# Patient Record
Sex: FEMALE | Employment: FULL TIME | URBAN - METROPOLITAN AREA
[De-identification: names, ages, dates, MRNs, and addresses within clinical notes are randomized per-mention and may not be internally consistent; named-entity substitution may affect disease eponyms.]

---

## 2020-06-04 ENCOUNTER — TELEPHONE (OUTPATIENT)
Dept: OBSTETRICS AND GYNECOLOGY | Facility: CLINIC | Age: 40
End: 2020-06-04

## 2020-06-04 NOTE — TELEPHONE ENCOUNTER
Please let patient know I reviewed her breast ultrasound. The areas they were watching (possibly cysts?) were stable in the right breast but a little larger in the left breast. IF she has not seen a breast surgeon before I would recommend doing so for further evaluation.

## 2020-06-08 ENCOUNTER — TELEPHONE (OUTPATIENT)
Dept: OBSTETRICS AND GYNECOLOGY | Facility: CLINIC | Age: 40
End: 2020-06-08

## 2020-06-08 NOTE — TELEPHONE ENCOUNTER
----- Message from Guadalupe Vitale LPN sent at 6/5/2020 11:48 AM CDT -----  No answer call again with breast us results

## 2020-06-12 ENCOUNTER — TELEPHONE (OUTPATIENT)
Dept: OBSTETRICS AND GYNECOLOGY | Facility: CLINIC | Age: 40
End: 2020-06-12

## 2020-06-12 NOTE — TELEPHONE ENCOUNTER
After multiple attempts to contact pt by phone unsuccessfully, we sent a letter for the pt to contact us by phone.

## 2021-09-16 ENCOUNTER — OFFICE VISIT (OUTPATIENT)
Dept: PRIMARY CARE CLINIC | Facility: CLINIC | Age: 41
End: 2021-09-16
Payer: COMMERCIAL

## 2021-09-16 VITALS
BODY MASS INDEX: 20.46 KG/M2 | OXYGEN SATURATION: 97 % | HEART RATE: 103 BPM | WEIGHT: 111.19 LBS | RESPIRATION RATE: 16 BRPM | SYSTOLIC BLOOD PRESSURE: 124 MMHG | TEMPERATURE: 97 F | HEIGHT: 62 IN | DIASTOLIC BLOOD PRESSURE: 82 MMHG

## 2021-09-16 DIAGNOSIS — Z00.00 ROUTINE GENERAL MEDICAL EXAMINATION AT A HEALTH CARE FACILITY: ICD-10-CM

## 2021-09-16 DIAGNOSIS — M79.10 MUSCLE PAIN: Primary | ICD-10-CM

## 2021-09-16 DIAGNOSIS — G20.A1 PARKINSON DISEASE: ICD-10-CM

## 2021-09-16 PROCEDURE — 99386 PR PREVENTIVE VISIT,NEW,40-64: ICD-10-PCS | Mod: S$GLB,,, | Performed by: INTERNAL MEDICINE

## 2021-09-16 PROCEDURE — 3079F PR MOST RECENT DIASTOLIC BLOOD PRESSURE 80-89 MM HG: ICD-10-PCS | Mod: CPTII,S$GLB,, | Performed by: INTERNAL MEDICINE

## 2021-09-16 PROCEDURE — 3074F SYST BP LT 130 MM HG: CPT | Mod: CPTII,S$GLB,, | Performed by: INTERNAL MEDICINE

## 2021-09-16 PROCEDURE — 1160F RVW MEDS BY RX/DR IN RCRD: CPT | Mod: CPTII,S$GLB,, | Performed by: INTERNAL MEDICINE

## 2021-09-16 PROCEDURE — 3079F DIAST BP 80-89 MM HG: CPT | Mod: CPTII,S$GLB,, | Performed by: INTERNAL MEDICINE

## 2021-09-16 PROCEDURE — 1159F PR MEDICATION LIST DOCUMENTED IN MEDICAL RECORD: ICD-10-PCS | Mod: CPTII,S$GLB,, | Performed by: INTERNAL MEDICINE

## 2021-09-16 PROCEDURE — 3074F PR MOST RECENT SYSTOLIC BLOOD PRESSURE < 130 MM HG: ICD-10-PCS | Mod: CPTII,S$GLB,, | Performed by: INTERNAL MEDICINE

## 2021-09-16 PROCEDURE — 3008F PR BODY MASS INDEX (BMI) DOCUMENTED: ICD-10-PCS | Mod: CPTII,S$GLB,, | Performed by: INTERNAL MEDICINE

## 2021-09-16 PROCEDURE — 99386 PREV VISIT NEW AGE 40-64: CPT | Mod: S$GLB,,, | Performed by: INTERNAL MEDICINE

## 2021-09-16 PROCEDURE — 3008F BODY MASS INDEX DOCD: CPT | Mod: CPTII,S$GLB,, | Performed by: INTERNAL MEDICINE

## 2021-09-16 PROCEDURE — 1159F MED LIST DOCD IN RCRD: CPT | Mod: CPTII,S$GLB,, | Performed by: INTERNAL MEDICINE

## 2021-09-16 PROCEDURE — 1160F PR REVIEW ALL MEDS BY PRESCRIBER/CLIN PHARMACIST DOCUMENTED: ICD-10-PCS | Mod: CPTII,S$GLB,, | Performed by: INTERNAL MEDICINE

## 2021-09-16 RX ORDER — BACLOFEN 10 MG/1
10 TABLET ORAL 3 TIMES DAILY
Qty: 90 TABLET | Refills: 11 | Status: SHIPPED | OUTPATIENT
Start: 2021-09-16 | End: 2023-09-26

## 2021-09-22 ENCOUNTER — TELEPHONE (OUTPATIENT)
Dept: PRIMARY CARE CLINIC | Facility: CLINIC | Age: 41
End: 2021-09-22
Payer: COMMERCIAL

## 2021-09-24 PROCEDURE — G0180 PR HOME HEALTH MD CERTIFICATION: ICD-10-PCS | Mod: ,,, | Performed by: INTERNAL MEDICINE

## 2021-09-24 PROCEDURE — G0180 MD CERTIFICATION HHA PATIENT: HCPCS | Mod: ,,, | Performed by: INTERNAL MEDICINE

## 2021-10-01 ENCOUNTER — TELEPHONE (OUTPATIENT)
Dept: PRIMARY CARE CLINIC | Facility: CLINIC | Age: 41
End: 2021-10-01

## 2021-10-22 ENCOUNTER — EXTERNAL HOME HEALTH (OUTPATIENT)
Dept: HOME HEALTH SERVICES | Facility: HOSPITAL | Age: 41
End: 2021-10-22
Payer: COMMERCIAL

## 2023-01-23 ENCOUNTER — PATIENT OUTREACH (OUTPATIENT)
Dept: ADMINISTRATIVE | Facility: HOSPITAL | Age: 43
End: 2023-01-23
Payer: COMMERCIAL

## 2023-01-23 NOTE — PROGRESS NOTES
Working gap report for pts not seen in the past 12 months or longer. According to CE pt is being seen in [place] with a Neurologist who is providing her care. LVM. No results found.

## 2023-09-26 ENCOUNTER — PATIENT OUTREACH (OUTPATIENT)
Dept: ADMINISTRATIVE | Facility: HOSPITAL | Age: 43
End: 2023-09-26
Payer: COMMERCIAL

## 2023-09-26 ENCOUNTER — OFFICE VISIT (OUTPATIENT)
Dept: PRIMARY CARE CLINIC | Facility: CLINIC | Age: 43
End: 2023-09-26
Payer: COMMERCIAL

## 2023-09-26 VITALS
BODY MASS INDEX: 23.37 KG/M2 | TEMPERATURE: 97 F | OXYGEN SATURATION: 99 % | HEIGHT: 62 IN | DIASTOLIC BLOOD PRESSURE: 59 MMHG | WEIGHT: 127 LBS | SYSTOLIC BLOOD PRESSURE: 97 MMHG | RESPIRATION RATE: 16 BRPM | HEART RATE: 84 BPM

## 2023-09-26 DIAGNOSIS — Z12.31 BREAST CANCER SCREENING BY MAMMOGRAM: ICD-10-CM

## 2023-09-26 DIAGNOSIS — Z00.00 ROUTINE GENERAL MEDICAL EXAMINATION AT A HEALTH CARE FACILITY: Primary | ICD-10-CM

## 2023-09-26 DIAGNOSIS — R05.3 CHRONIC COUGH: ICD-10-CM

## 2023-09-26 DIAGNOSIS — G20.A1 PARKINSON DISEASE: ICD-10-CM

## 2023-09-26 PROCEDURE — 99396 PR PREVENTIVE VISIT,EST,40-64: ICD-10-PCS | Mod: S$GLB,,, | Performed by: INTERNAL MEDICINE

## 2023-09-26 PROCEDURE — 1160F RVW MEDS BY RX/DR IN RCRD: CPT | Mod: CPTII,S$GLB,, | Performed by: INTERNAL MEDICINE

## 2023-09-26 PROCEDURE — 1160F PR REVIEW ALL MEDS BY PRESCRIBER/CLIN PHARMACIST DOCUMENTED: ICD-10-PCS | Mod: CPTII,S$GLB,, | Performed by: INTERNAL MEDICINE

## 2023-09-26 PROCEDURE — 1159F MED LIST DOCD IN RCRD: CPT | Mod: CPTII,S$GLB,, | Performed by: INTERNAL MEDICINE

## 2023-09-26 PROCEDURE — 3008F BODY MASS INDEX DOCD: CPT | Mod: CPTII,S$GLB,, | Performed by: INTERNAL MEDICINE

## 2023-09-26 PROCEDURE — 3078F DIAST BP <80 MM HG: CPT | Mod: CPTII,S$GLB,, | Performed by: INTERNAL MEDICINE

## 2023-09-26 PROCEDURE — 99396 PREV VISIT EST AGE 40-64: CPT | Mod: S$GLB,,, | Performed by: INTERNAL MEDICINE

## 2023-09-26 PROCEDURE — 3008F PR BODY MASS INDEX (BMI) DOCUMENTED: ICD-10-PCS | Mod: CPTII,S$GLB,, | Performed by: INTERNAL MEDICINE

## 2023-09-26 PROCEDURE — 1159F PR MEDICATION LIST DOCUMENTED IN MEDICAL RECORD: ICD-10-PCS | Mod: CPTII,S$GLB,, | Performed by: INTERNAL MEDICINE

## 2023-09-26 PROCEDURE — 3074F PR MOST RECENT SYSTOLIC BLOOD PRESSURE < 130 MM HG: ICD-10-PCS | Mod: CPTII,S$GLB,, | Performed by: INTERNAL MEDICINE

## 2023-09-26 PROCEDURE — 3074F SYST BP LT 130 MM HG: CPT | Mod: CPTII,S$GLB,, | Performed by: INTERNAL MEDICINE

## 2023-09-26 PROCEDURE — 3078F PR MOST RECENT DIASTOLIC BLOOD PRESSURE < 80 MM HG: ICD-10-PCS | Mod: CPTII,S$GLB,, | Performed by: INTERNAL MEDICINE

## 2023-09-26 RX ORDER — ALBUTEROL SULFATE 90 UG/1
2 AEROSOL, METERED RESPIRATORY (INHALATION) EVERY 6 HOURS PRN
Qty: 18 G | Refills: 0 | Status: SHIPPED | OUTPATIENT
Start: 2023-09-26 | End: 2024-09-25

## 2023-09-26 RX ORDER — CARBIDOPA AND LEVODOPA 25; 100 MG/1; MG/1
1.5 TABLET ORAL
COMMUNITY
Start: 2023-09-23

## 2023-09-26 RX ORDER — AMANTADINE HYDROCHLORIDE 100 MG/1
1 CAPSULE, GELATIN COATED ORAL DAILY
COMMUNITY
Start: 2023-09-13

## 2023-09-26 RX ORDER — MAGNESIUM 250 MG
1 TABLET ORAL DAILY
COMMUNITY

## 2023-09-26 NOTE — PROGRESS NOTES
Subjective:      Patient ID: Winter Pina is a 42 y.o. female.    Chief Complaint: Cough (C/o ongoing dry nonproductive cough for >1yr)     Patient with Parkinson disease and is being followed by neurologist in [facility], Dr Humera Trammell.  Patient reports symptoms are under okay control.    Patient presented today for wellness exam.  And also complained of cough x >1 year.  Cough is nonproductive, almost every day, shortness a breath, no fever or chills, reports occasional runny nose, Nasal congestion, postnasal drip.  Patient denies any history of asthma or tobacco consumption.    Patient also complains of insomnia and reports going to bed at 10:30 p.m. and wakes up at 2:00 a.m..  Patient is not able to go back to sleep and starts reading.  Patient may go back to sleep from 9-10 a.m. but denies more daytime naps.  Patient denies feeling tired throughout the day.    Review of Systems   Constitutional:  Negative for chills, diaphoresis, fever, malaise/fatigue and weight loss.   HENT:  Negative for congestion, ear pain, sinus pain, sore throat and tinnitus.    Eyes:  Negative for blurred vision and photophobia.   Respiratory:  Positive for cough. Negative for hemoptysis, shortness of breath and wheezing.    Cardiovascular:  Negative for chest pain, palpitations, orthopnea, leg swelling and PND.   Gastrointestinal:  Negative for abdominal pain, blood in stool, constipation, diarrhea, heartburn, melena, nausea and vomiting.   Genitourinary:  Negative for dysuria, frequency and urgency.   Musculoskeletal:  Negative for back pain, myalgias and neck pain.   Skin:  Negative for rash.   Neurological:  Negative for dizziness, tremors, seizures, loss of consciousness and weakness.   Endo/Heme/Allergies:  Negative for polydipsia.   Psychiatric/Behavioral:  Negative for depression and hallucinations. The patient does not have insomnia.      Objective:   BP (!) 97/59 (BP Location: Left arm,  "Patient Position: Sitting, BP Method: Medium (Automatic))   Pulse 84   Temp 97.2 °F (36.2 °C) (Temporal)   Resp 16   Ht 5' 2" (1.575 m)   Wt 57.6 kg (127 lb)   SpO2 99%   BMI 23.23 kg/m²     Physical Exam  Constitutional:       General: She is not in acute distress.     Appearance: She is not diaphoretic.   HENT:      Mouth/Throat:      Comments: No mucoid discharge on posterior pharynx  Neck:      Thyroid: No thyromegaly.   Cardiovascular:      Rate and Rhythm: Normal rate and regular rhythm.      Heart sounds: Normal heart sounds. No murmur heard.  Pulmonary:      Effort: Pulmonary effort is normal. No respiratory distress.      Breath sounds: Normal breath sounds. No wheezing.   Abdominal:      General: Bowel sounds are normal. There is no distension.      Palpations: Abdomen is soft.      Tenderness: There is no abdominal tenderness.   Lymphadenopathy:      Cervical: No cervical adenopathy.   Neurological:      Mental Status: She is alert and oriented to person, place, and time.   Psychiatric:         Behavior: Behavior normal.         Thought Content: Thought content normal.         Judgment: Judgment normal.       Assessment:       ICD-10-CM ICD-9-CM   1. Routine general medical examination at a health care facility  Z00.00 V70.0   2. Parkinson disease  G20 332.0   3. Breast cancer screening by mammogram  Z12.31 V76.12   4. Chronic cough  R05.3 786.2       Plan:     Patient is here for wellness exam, Will order Annual labs  Will order mammogram + Pap smear  Patient has Parkinson's disease and is being followed by neurologist at [facility]  Patient's symptoms are under good control advised patient to keep appointment  Patient has chronic cough, will use ProAir for symptomatic relief  Will get chest x-ray and pulmonary function test..  To further evaluate patient cause of cough  Patient complains of insomnia but is happy with her sleep habits.  Does not feel tired throughout the day.   Will not start on any " medication at this time.    Medication List with Changes/Refills   New Medications    ALBUTEROL (PROAIR HFA) 90 MCG/ACTUATION INHALER    Inhale 2 puffs into the lungs every 6 (six) hours as needed for Wheezing. Rescue   Current Medications    AMANTADINE HCL (SYMMETREL) 100 MG CAPSULE    Take 1 capsule by mouth once daily.    BACLOFEN (LIORESAL) 10 MG TABLET    Take 1 tablet (10 mg total) by mouth 3 (three) times daily.    CARBIDOPA-LEVODOPA  MG (SINEMET)  MG PER TABLET    Take 1.5 tablets by mouth 5 (five) times daily.    ENZYMES,DIGESTIVE (DIGESTIVE ENZYMES ORAL)    Take 2 capsules by mouth once daily. OTC    LACTOBAC NO.41/BIFIDOBACT NO.7 (PROBIOTIC-10 ORAL)    Take 1 capsule by mouth once daily. OTC    MAGNESIUM 250 MG TAB    Take 1 tablet by mouth once daily. OTC

## 2023-09-26 NOTE — PROGRESS NOTES
Attempted to contact patient by phone for PCP visit, was able to speak to patient. Patient saw PCP today and has appointment in December.

## 2023-10-16 LAB — BCS RECOMMENDATION EXT: NORMAL

## 2023-10-24 DIAGNOSIS — R92.8 ABNORMAL MAMMOGRAM OF BOTH BREASTS: Primary | ICD-10-CM

## 2023-11-15 DIAGNOSIS — R92.8 ABNORMAL MAMMOGRAM OF BOTH BREASTS: Primary | ICD-10-CM

## 2023-11-29 NOTE — PROGRESS NOTES
CC: WELL WOMAN (age 40-44)    Patient Care Team:  Fely Merida MD as PCP - General (Internal Medicine)  Claire Flores MD (Inactive) (General Surgery)  Phoebe Benjamin MD (Gastroenterology)    HPI:  Patient is a 42 y.o. who presents for her well woman exam today.  History reviewed with patient. Cycles are regular every month- not heavy or painfula and last about 6-7 days total  Patient is without complaints or concerns today.     NEW PATIENT       CONTRACEPTION:  none  GARDASIL: no- declines  HX ABNL PAPS: none    REVIEW OF PRIOR DATA/ HEALTH MAINTENANCE:  LAST ANNUAL:        LAST MMG (screening)- 2023- normal at [facility]-bilsteral benign cysts- follow up in 6 months  LAST LABS- does with specialist   LAST COLONOSCOPY- - by Dr. Benjamin - q 10 yrs (neg)      Past Medical History:   Diagnosis Date    Family history of breast cancer     myriad neg-LTR= 10%    Hypoglycemia     IBS (irritable bowel syndrome)     Lump of breast, left     Miscarriage     Parkinson disease     [place]    Poor nutrition      SURGICAL HX:   has a past surgical history that includes  section; Dilation and curettage of uterus (2018); Dilation and curettage of uterus (2017); and Breast biopsy (Left, ).    SOCIAL HX:    reports that she has never smoked. She has never used smokeless tobacco. She reports that she does not drink alcohol and does not use drugs.    FAMILY HX:   family history includes Breast cancer (age of onset: 50) in her maternal aunt; Heart attack (age of onset: 51) in her father; Uterine cancer (age of onset: 40) in her mother. .    ALLERGIES:  Anesthesia s/i-40 (propofol) [propofol] and Codeine    Current Outpatient Medications   Medication Instructions    albuterol (PROAIR HFA) 90 mcg/actuation inhaler 2 puffs, Inhalation, Every 6 hours PRN, Rescue    amantadine HCL (SYMMETREL) 100 mg capsule 1 capsule, Oral, Daily    carbidopa-levodopa  mg (SINEMET)   "mg per tablet 1.5 tablets, Oral, 5 times daily    enzymes,digestive (DIGESTIVE ENZYMES ORAL) 2 capsules, Oral, Daily, OTC    Lactobac no.41/Bifidobact no.7 (PROBIOTIC-10 ORAL) 1 capsule, Oral, Daily, OTC    magnesium 250 mg Tab 1 tablet, Oral, Daily, OTC     ROS:  CONST:  No fever, chills, fatigue or unexpected changes in weight   CV: No chest pain or palpitations   RESP:  No shortness of breath or cough   GI: No abd pain, vomiting, diarrhea, blood in stool, or changes in bowel mvmts   SKIN: No rashes or lesions  MUSCULOSKELETAL: No joint swelling or pain   PSYCH: No changes in mood or insomia   BREASTS: No asymmetry, lumps, pain, nipple discharge, or skin changes   :  No dysuria, urgency, frequency, hematuria or incontinence           No vag dc, itching, odor or dryness           No pelvic pain, dyspareunia, or abnormal vaginal bleeding     VITALS:  Blood pressure 99/63, height 5' 2" (1.575 m), weight 55.8 kg (123 lb), last menstrual period 11/27/2023.  Body mass index is 22.5 kg/m².     PHYSICAL EXAM-  APPEARANCE: Well appearing, in no acute distress.   NECK: Neck symmetric.   CV:  Normal rate   PULM: Normal resp rate, no resp distress, normal resp effort    PSYCH: Normal mood and affect, cooperative   SKIN: No rashes, lesions, or abnormal bruising   LYMPH: No inguinal or axillary adenopathy   ABD: Soft, without tenderness or masses.   BREAST: Symmetrical, no nipple changes, no skin changes, No palpable masses   PELVIC:  VULVA: No lesions. Normal female genitalia.  URETHRAL MEATUS: No masses, no significant prolapse.   BLADDER/ URETHRA: No masses or suprapubic tenderness   VAGINA: No lesions or erythema, No discharge.   CVX: No lesions,no cervical motion tenderness.   UTERUS: Normal size/shape, mobile, non-tender   ADNEXA: No masses, tenderness, or fullness.   ANUS/ PERINEUM: Normal tone.  No lesions.     *female chaperone present for entire exam    ASSESSMENT and PLAN:  Encounter for well woman exam with " routine gynecological exam  -     Liquid-based pap smear, screening    Bilateral breast cysts  -     Mammo Digital Diagnostic Bilat with [facility]; Future; Expected date: 12/06/2023    Routine general medical examination at a health care facility  -     Ambulatory referral/consult to Obstetrics / Gynecology       FOLLOWUP:  1 year for wwe or sooner prn    COUNSELING:  Patient was counseled today on recommendation for yearly pelvic exam, current Pap guidelines, self breast exams, contraception, recommendations for annual screening mammograms, and routine screening colonoscopy at age 43.  Encouraged patient to see her PCP for other health maintenance.

## 2023-11-30 ENCOUNTER — OFFICE VISIT (OUTPATIENT)
Dept: OBSTETRICS AND GYNECOLOGY | Facility: CLINIC | Age: 43
End: 2023-11-30
Payer: COMMERCIAL

## 2023-11-30 VITALS
BODY MASS INDEX: 22.63 KG/M2 | WEIGHT: 123 LBS | HEIGHT: 62 IN | DIASTOLIC BLOOD PRESSURE: 63 MMHG | SYSTOLIC BLOOD PRESSURE: 99 MMHG

## 2023-11-30 DIAGNOSIS — N60.02 BILATERAL BREAST CYSTS: ICD-10-CM

## 2023-11-30 DIAGNOSIS — N60.01 BILATERAL BREAST CYSTS: ICD-10-CM

## 2023-11-30 DIAGNOSIS — Z01.419 ENCOUNTER FOR WELL WOMAN EXAM WITH ROUTINE GYNECOLOGICAL EXAM: Primary | ICD-10-CM

## 2023-11-30 DIAGNOSIS — Z00.00 ROUTINE GENERAL MEDICAL EXAMINATION AT A HEALTH CARE FACILITY: ICD-10-CM

## 2023-11-30 PROCEDURE — 3008F PR BODY MASS INDEX (BMI) DOCUMENTED: ICD-10-PCS | Mod: CPTII,S$GLB,, | Performed by: OBSTETRICS & GYNECOLOGY

## 2023-11-30 PROCEDURE — 3008F BODY MASS INDEX DOCD: CPT | Mod: CPTII,S$GLB,, | Performed by: OBSTETRICS & GYNECOLOGY

## 2023-11-30 PROCEDURE — 99386 PREV VISIT NEW AGE 40-64: CPT | Mod: S$GLB,,, | Performed by: OBSTETRICS & GYNECOLOGY

## 2023-11-30 PROCEDURE — 3078F DIAST BP <80 MM HG: CPT | Mod: CPTII,S$GLB,, | Performed by: OBSTETRICS & GYNECOLOGY

## 2023-11-30 PROCEDURE — 3074F SYST BP LT 130 MM HG: CPT | Mod: CPTII,S$GLB,, | Performed by: OBSTETRICS & GYNECOLOGY

## 2023-11-30 PROCEDURE — 3078F PR MOST RECENT DIASTOLIC BLOOD PRESSURE < 80 MM HG: ICD-10-PCS | Mod: CPTII,S$GLB,, | Performed by: OBSTETRICS & GYNECOLOGY

## 2023-11-30 PROCEDURE — 99386 PR PREVENTIVE VISIT,NEW,40-64: ICD-10-PCS | Mod: S$GLB,,, | Performed by: OBSTETRICS & GYNECOLOGY

## 2023-11-30 PROCEDURE — 1159F PR MEDICATION LIST DOCUMENTED IN MEDICAL RECORD: ICD-10-PCS | Mod: CPTII,S$GLB,, | Performed by: OBSTETRICS & GYNECOLOGY

## 2023-11-30 PROCEDURE — 3074F PR MOST RECENT SYSTOLIC BLOOD PRESSURE < 130 MM HG: ICD-10-PCS | Mod: CPTII,S$GLB,, | Performed by: OBSTETRICS & GYNECOLOGY

## 2023-11-30 PROCEDURE — 1159F MED LIST DOCD IN RCRD: CPT | Mod: CPTII,S$GLB,, | Performed by: OBSTETRICS & GYNECOLOGY

## 2023-12-04 LAB — Lab: NORMAL

## 2023-12-11 ENCOUNTER — PATIENT OUTREACH (OUTPATIENT)
Dept: ADMINISTRATIVE | Facility: HOSPITAL | Age: 43
End: 2023-12-11
Payer: COMMERCIAL

## 2023-12-12 NOTE — PROGRESS NOTES
Oct 2023 Mammogram hyper linked to  with recommendation for US.  Nov 2023 US Breast Rt, entered/scanned into chart, sent to pcp.

## 2023-12-18 ENCOUNTER — OFFICE VISIT (OUTPATIENT)
Dept: PRIMARY CARE CLINIC | Facility: CLINIC | Age: 43
End: 2023-12-18
Payer: COMMERCIAL

## 2023-12-18 VITALS
OXYGEN SATURATION: 99 % | BODY MASS INDEX: 23.55 KG/M2 | HEART RATE: 83 BPM | WEIGHT: 128 LBS | DIASTOLIC BLOOD PRESSURE: 61 MMHG | RESPIRATION RATE: 18 BRPM | HEIGHT: 62 IN | TEMPERATURE: 98 F | SYSTOLIC BLOOD PRESSURE: 98 MMHG

## 2023-12-18 DIAGNOSIS — G20.A1 PARKINSON'S DISEASE WITHOUT DYSKINESIA OR FLUCTUATING MANIFESTATIONS: ICD-10-CM

## 2023-12-18 DIAGNOSIS — R92.8 ABNORMAL MAMMOGRAM OF BOTH BREASTS: ICD-10-CM

## 2023-12-18 DIAGNOSIS — R05.3 CHRONIC COUGH: Primary | ICD-10-CM

## 2023-12-18 PROCEDURE — 3078F PR MOST RECENT DIASTOLIC BLOOD PRESSURE < 80 MM HG: ICD-10-PCS | Mod: CPTII,S$GLB,, | Performed by: INTERNAL MEDICINE

## 2023-12-18 PROCEDURE — 3074F PR MOST RECENT SYSTOLIC BLOOD PRESSURE < 130 MM HG: ICD-10-PCS | Mod: CPTII,S$GLB,, | Performed by: INTERNAL MEDICINE

## 2023-12-18 PROCEDURE — 1160F PR REVIEW ALL MEDS BY PRESCRIBER/CLIN PHARMACIST DOCUMENTED: ICD-10-PCS | Mod: CPTII,S$GLB,, | Performed by: INTERNAL MEDICINE

## 2023-12-18 PROCEDURE — 1160F RVW MEDS BY RX/DR IN RCRD: CPT | Mod: CPTII,S$GLB,, | Performed by: INTERNAL MEDICINE

## 2023-12-18 PROCEDURE — 3078F DIAST BP <80 MM HG: CPT | Mod: CPTII,S$GLB,, | Performed by: INTERNAL MEDICINE

## 2023-12-18 PROCEDURE — 3008F BODY MASS INDEX DOCD: CPT | Mod: CPTII,S$GLB,, | Performed by: INTERNAL MEDICINE

## 2023-12-18 PROCEDURE — 1159F MED LIST DOCD IN RCRD: CPT | Mod: CPTII,S$GLB,, | Performed by: INTERNAL MEDICINE

## 2023-12-18 PROCEDURE — 99214 PR OFFICE/OUTPT VISIT, EST, LEVL IV, 30-39 MIN: ICD-10-PCS | Mod: S$GLB,,, | Performed by: INTERNAL MEDICINE

## 2023-12-18 PROCEDURE — 3074F SYST BP LT 130 MM HG: CPT | Mod: CPTII,S$GLB,, | Performed by: INTERNAL MEDICINE

## 2023-12-18 PROCEDURE — 3008F PR BODY MASS INDEX (BMI) DOCUMENTED: ICD-10-PCS | Mod: CPTII,S$GLB,, | Performed by: INTERNAL MEDICINE

## 2023-12-18 PROCEDURE — 99214 OFFICE O/P EST MOD 30 MIN: CPT | Mod: S$GLB,,, | Performed by: INTERNAL MEDICINE

## 2023-12-18 PROCEDURE — 1159F PR MEDICATION LIST DOCUMENTED IN MEDICAL RECORD: ICD-10-PCS | Mod: CPTII,S$GLB,, | Performed by: INTERNAL MEDICINE

## 2023-12-18 RX ORDER — PREDNISONE 20 MG/1
20 TABLET ORAL DAILY
Qty: 7 TABLET | Refills: 0 | Status: SHIPPED | OUTPATIENT
Start: 2023-12-18 | End: 2023-12-25

## 2023-12-18 RX ORDER — BENZONATATE 100 MG/1
100 CAPSULE ORAL 3 TIMES DAILY PRN
Qty: 30 CAPSULE | Refills: 0 | Status: SHIPPED | OUTPATIENT
Start: 2023-12-18 | End: 2023-12-28

## 2023-12-18 NOTE — PROGRESS NOTES
Subjective:      Patient ID: Winter Pina is a 43 y.o. female.    Chief Complaint: Follow-up (Pt c/o cough)    : Patient with Parkinson disease and is being followed by neurologist in [facility], Dr Humera Trammell.  Patient reports symptoms are under okay control.     Patient presented on last visit with persistent cough for more than a year.  Cough is nonproductive, almost every day, not associated with shortness of breath no fever or chills.  Patient had chest x-ray that was negative and reports pulmonary function tests were also done (results not available) patient was given albuterol inhaler without much help.  Patient reports no runny nose, nasal congestion, postnasal drip.  Patient reports over-the-counter medication may help.  No GERD symptoms    Patient reports snoring at night, waking up feeling tired, taking naps during daytime.     Review of Systems   Constitutional:  Negative for chills, diaphoresis, fever, malaise/fatigue and weight loss.   HENT:  Negative for congestion, ear pain, sinus pain, sore throat and tinnitus.    Eyes:  Negative for blurred vision and photophobia.   Respiratory:  Positive for cough. Negative for hemoptysis, shortness of breath and wheezing.    Cardiovascular:  Negative for chest pain, palpitations, orthopnea, leg swelling and PND.   Gastrointestinal:  Negative for abdominal pain, blood in stool, constipation, diarrhea, heartburn, melena, nausea and vomiting.   Genitourinary:  Negative for dysuria, frequency and urgency.   Musculoskeletal:  Negative for back pain, myalgias and neck pain.   Skin:  Negative for rash.   Neurological:  Negative for dizziness, tremors, seizures, loss of consciousness and weakness.   Endo/Heme/Allergies:  Negative for polydipsia.   Psychiatric/Behavioral:  Negative for depression and hallucinations. The patient does not have insomnia.      Objective:   BP 98/61 (BP Location: Left arm, Patient Position: Sitting, BP Method:  "Medium (Automatic))   Pulse 83   Temp 98.1 °F (36.7 °C) (Oral)   Resp 18   Ht 5' 2" (1.575 m)   Wt 58.1 kg (128 lb)   LMP 11/27/2023 (Approximate)   SpO2 99%   BMI 23.41 kg/m²     Physical Exam  Constitutional:       General: She is not in acute distress.     Appearance: She is not diaphoretic.   Neck:      Thyroid: No thyromegaly.   Cardiovascular:      Rate and Rhythm: Normal rate and regular rhythm.      Heart sounds: Normal heart sounds. No murmur heard.  Pulmonary:      Effort: Pulmonary effort is normal. No respiratory distress.      Breath sounds: Normal breath sounds. No wheezing.   Abdominal:      General: Bowel sounds are normal. There is no distension.      Palpations: Abdomen is soft.      Tenderness: There is no abdominal tenderness.   Lymphadenopathy:      Cervical: No cervical adenopathy.   Neurological:      Mental Status: She is alert and oriented to person, place, and time.   Psychiatric:         Behavior: Behavior normal.         Thought Content: Thought content normal.         Judgment: Judgment normal.       Assessment:       ICD-10-CM ICD-9-CM   1. Chronic cough  R05.3 786.2   2. Abnormal mammogram of both breasts  R92.8 793.80   3. Parkinson's disease without dyskinesia or fluctuating manifestations  G20.A1 332.0       Plan:     Patient with chronic dry cough.   Chest x-ray is normal  Pulmonary function test are done but results are not available  Patient has prescribed albuterol without much help with the symptoms  Will use prednisone and Tessalon  Will follow-up on pulmonary function test  Had mammogram that was abnormal + ultrasound showed benign looking cyst in the breast  Repeat ultrasound is recommended in 6 months (May 2024)  Patient has Parkinson's disease and is being followed by neurologist and symptoms are under good control  Advised patient to keep appointments    Medication List with Changes/Refills   Current Medications    ALBUTEROL (PROAIR HFA) 90 MCG/ACTUATION INHALER   "  Inhale 2 puffs into the lungs every 6 (six) hours as needed for Wheezing. Rescue    AMANTADINE HCL (SYMMETREL) 100 MG CAPSULE    Take 1 capsule by mouth once daily.    CARBIDOPA-LEVODOPA  MG (SINEMET)  MG PER TABLET    Take 1.5 tablets by mouth 5 (five) times daily.    ENZYMES,DIGESTIVE (DIGESTIVE ENZYMES ORAL)    Take 2 capsules by mouth once daily. OTC    LACTOBAC NO.41/BIFIDOBACT NO.7 (PROBIOTIC-10 ORAL)    Take 1 capsule by mouth once daily. OTC    MAGNESIUM 250 MG TAB    Take 1 tablet by mouth once daily. OTC

## 2024-01-03 ENCOUNTER — TELEPHONE (OUTPATIENT)
Dept: PRIMARY CARE CLINIC | Facility: CLINIC | Age: 44
End: 2024-01-03
Payer: COMMERCIAL

## 2024-01-03 NOTE — TELEPHONE ENCOUNTER
Patient had PFT at [facility], request was sent to there office for records. Patient advised to contact there office regarding billing questions.

## 2024-01-03 NOTE — TELEPHONE ENCOUNTER
----- Message from Nelsy Little sent at 1/3/2024  3:17 PM CST -----  Contact: self  Type:  Test Results    Who Called: Winter Pina  Name of Test (Lab/Mammo/Etc): breathing  Date of Test: 12/2023  Ordering Provider: vince  Where the test was performed: lab  Would the patient rather a call back or a response via [facility]?   Best Call Back Number: [phone]  Additional Information:  n/a

## 2024-01-09 ENCOUNTER — TELEPHONE (OUTPATIENT)
Dept: PRIMARY CARE CLINIC | Facility: CLINIC | Age: 44
End: 2024-01-09
Payer: COMMERCIAL

## 2024-01-09 NOTE — TELEPHONE ENCOUNTER
----- Message from Cira Burns sent at 1/9/2024  3:54 PM CST -----  Contact: Felipe()  Felipe called to consult with nurse or staff regarding the patient. He wanted to verify what time the lab opens on tomorrow and also wanted to verify if the opening was available for the patient to come in for bloodwork. He would like a call back and can be reached at [phone]. Thanks/MR

## 2024-01-12 ENCOUNTER — CLINICAL SUPPORT (OUTPATIENT)
Dept: OBSTETRICS AND GYNECOLOGY | Facility: CLINIC | Age: 44
End: 2024-01-12
Payer: COMMERCIAL

## 2024-01-12 DIAGNOSIS — Z01.89 ROUTINE LAB DRAW: Primary | ICD-10-CM

## 2024-01-12 LAB
ABS NRBC COUNT: 0 THOU/UL (ref 0–0.01)
ABSOLUTE BASOPHIL: 0.1 10*3/UL (ref 0–0.3)
ABSOLUTE EOSINOPHIL: 0.1 10*3/UL (ref 0–0.6)
ABSOLUTE IMMATURE GRAN: 0.01 THOU/UL (ref 0–0.03)
ABSOLUTE LYMPHOCYTE: 2.7 10*3/UL (ref 1.2–4)
ABSOLUTE MONOCYTE: 0.4 10*3/UL (ref 0.1–0.8)
ALBUMIN SERPL BCP-MCNC: 4 G/DL (ref 3.4–5)
ALP SERPL-CCNC: 69 U/L (ref 45–117)
ALT SERPL W P-5'-P-CCNC: 22 U/L (ref 13–56)
ANION GAP SERPL CALC-SCNC: 2 MMOL/L (ref 3–11)
AST SERPL-CCNC: 10 U/L (ref 15–37)
BASOPHILS NFR BLD: 0.9 % (ref 0–3)
BILIRUB SERPL-MCNC: 0.5 MG/DL (ref 0.2–1)
BUN SERPL-MCNC: 11 MG/DL (ref 7–18)
BUN/CREAT SERPL: 16.41 RATIO
CALCIUM SERPL-MCNC: 9.2 MG/DL (ref 8.5–10.1)
CHLORIDE SERPL-SCNC: 105 MMOL/L (ref 98–107)
CHOLEST SERPL-MSCNC: 179 MG/DL
CO2 SERPL-SCNC: 29 MMOL/L (ref 21–32)
CREAT SERPL-MCNC: 0.67 MG/DL (ref 0.55–1.02)
EOSINOPHIL NFR BLD: 0.9 % (ref 0–6)
ERYTHROCYTE [DISTWIDTH] IN BLOOD BY AUTOMATED COUNT: 12.5 % (ref 0–15.5)
GFR ESTIMATION: > 60
GLUCOSE SERPL-MCNC: 92 MG/DL (ref 74–106)
HCT VFR BLD AUTO: 40.3 % (ref 37–47)
HCV AB SERPL QL IA: NONREACTIVE
HDLC SERPL-MCNC: 57 MG/DL
HGB BLD-MCNC: 13 G/DL (ref 12–16)
IMMATURE GRANULOCYTES: 0.2 % (ref 0–0.43)
LDLC SERPL CALC-MCNC: 109.4 MG/DL
LYMPHOCYTES NFR BLD: 47.3 % (ref 20–45)
MCH RBC QN AUTO: 30 PG (ref 27–32)
MCHC RBC AUTO-ENTMCNC: 32.3 % (ref 32–36)
MCV RBC AUTO: 92.9 FL (ref 80–99)
MONOCYTES NFR BLD: 7.8 % (ref 2–10)
NEUTROPHILS # BLD AUTO: 2.4 10*3/UL (ref 1.4–7)
NEUTROPHILS NFR BLD: 42.9 % (ref 50–80)
NUCLEATED RED BLOOD CELLS: 0 % (ref 0–0.2)
PLATELETS: 344 10*3/UL (ref 130–400)
PMV BLD AUTO: 9.4 FL (ref 9.2–12.2)
POTASSIUM SERPL-SCNC: 3.8 MMOL/L (ref 3.5–5.1)
PROT SERPL-MCNC: 7.4 G/DL (ref 6.4–8.2)
RBC # BLD AUTO: 4.34 10*6/UL (ref 4.2–5.4)
SODIUM BLD-SCNC: 136 MMOL/L (ref 131–143)
TRIGL SERPL-MCNC: 63 MG/DL (ref 0–149)
TSH SERPL DL<=0.005 MIU/L-ACNC: 1.77 UIU/ML (ref 0.36–3.74)
VLDL CHOLESTEROL: 13 MG/DL
WBC # BLD: 5.7 10*3/UL (ref 4.5–10)

## 2024-01-12 PROCEDURE — 36415 COLL VENOUS BLD VENIPUNCTURE: CPT | Mod: S$GLB,,, | Performed by: INTERNAL MEDICINE

## 2024-05-06 ENCOUNTER — OFFICE VISIT (OUTPATIENT)
Dept: PRIMARY CARE CLINIC | Facility: CLINIC | Age: 44
End: 2024-05-06
Payer: COMMERCIAL

## 2024-05-06 VITALS
HEIGHT: 62 IN | BODY MASS INDEX: 24.44 KG/M2 | SYSTOLIC BLOOD PRESSURE: 97 MMHG | HEART RATE: 96 BPM | WEIGHT: 132.81 LBS | DIASTOLIC BLOOD PRESSURE: 67 MMHG | RESPIRATION RATE: 18 BRPM | TEMPERATURE: 98 F | OXYGEN SATURATION: 99 %

## 2024-05-06 DIAGNOSIS — G20.A1 PARKINSON'S DISEASE WITHOUT DYSKINESIA OR FLUCTUATING MANIFESTATIONS: ICD-10-CM

## 2024-05-06 DIAGNOSIS — M54.2 NECK PAIN: Primary | ICD-10-CM

## 2024-05-06 PROCEDURE — 1160F RVW MEDS BY RX/DR IN RCRD: CPT | Mod: CPTII,S$GLB,, | Performed by: INTERNAL MEDICINE

## 2024-05-06 PROCEDURE — 3074F SYST BP LT 130 MM HG: CPT | Mod: CPTII,S$GLB,, | Performed by: INTERNAL MEDICINE

## 2024-05-06 PROCEDURE — 1159F MED LIST DOCD IN RCRD: CPT | Mod: CPTII,S$GLB,, | Performed by: INTERNAL MEDICINE

## 2024-05-06 PROCEDURE — 3078F DIAST BP <80 MM HG: CPT | Mod: CPTII,S$GLB,, | Performed by: INTERNAL MEDICINE

## 2024-05-06 PROCEDURE — 3008F BODY MASS INDEX DOCD: CPT | Mod: CPTII,S$GLB,, | Performed by: INTERNAL MEDICINE

## 2024-05-06 PROCEDURE — 99214 OFFICE O/P EST MOD 30 MIN: CPT | Mod: S$GLB,,, | Performed by: INTERNAL MEDICINE

## 2024-05-06 NOTE — PROGRESS NOTES
Subjective:      Patient ID: Winter Pina is a 43 y.o. female.    Chief Complaint: Low-back Pain (C/o lower back pain since easter 2024) and Neck Pain (Neck pain since easter due to exercising / discuss x-ray )     Patient with Parkinson disease and is being followed by neurologist in [facility], Dr Humera Trammell.  Patient reports symptoms are under okay control.     Patient presented today with complains of low back pain x 4 weeks.  Patient reports that she was on treadmill when she is for felt some pain in lower back, pain was constant, sharp, radiating down the right leg..  Patient denies any tingling or numbness in the leg but has some sharp pain.  Pain is improving in intensity but is still there.  Patient also developed some neck pain for the same duration as well.  Pain is constant, sharp, severe, radiates down the left arm.  Patient reports some numbness in bilateral shoulder but no weakness in hands or arm.    Review of Systems   Constitutional:  Negative for chills, diaphoresis, fever, malaise/fatigue and weight loss.   HENT:  Negative for congestion, ear pain, sinus pain, sore throat and tinnitus.    Eyes:  Negative for blurred vision and photophobia.   Respiratory:  Negative for cough, hemoptysis, shortness of breath and wheezing.    Cardiovascular:  Negative for chest pain, palpitations, orthopnea, leg swelling and PND.   Gastrointestinal:  Negative for abdominal pain, blood in stool, constipation, diarrhea, heartburn, melena, nausea and vomiting.   Genitourinary:  Negative for dysuria, frequency and urgency.   Musculoskeletal:  Positive for neck pain. Negative for back pain and myalgias.   Skin:  Negative for rash.   Neurological:  Negative for dizziness, tremors, seizures, loss of consciousness and weakness.   Endo/Heme/Allergies:  Negative for polydipsia.   Psychiatric/Behavioral:  Negative for depression and hallucinations. The patient does not have insomnia.   "    Objective:   BP 97/67 (BP Location: Left arm, Patient Position: Sitting, BP Method: Medium (Automatic))   Pulse 96   Temp 98.2 °F (36.8 °C) (Oral)   Resp 18   Ht 5' 2" (1.575 m)   Wt 60.2 kg (132 lb 12.8 oz)   LMP 04/16/2024 (Approximate)   SpO2 99%   BMI 24.29 kg/m²     Physical Exam  Constitutional:       General: She is not in acute distress.     Appearance: She is not diaphoretic.   Neck:      Thyroid: No thyromegaly.   Cardiovascular:      Rate and Rhythm: Normal rate and regular rhythm.      Heart sounds: Normal heart sounds. No murmur heard.  Pulmonary:      Effort: Pulmonary effort is normal. No respiratory distress.      Breath sounds: Normal breath sounds. No wheezing.   Abdominal:      General: Bowel sounds are normal. There is no distension.      Palpations: Abdomen is soft.      Tenderness: There is no abdominal tenderness.   Lymphadenopathy:      Cervical: No cervical adenopathy.   Neurological:      Mental Status: She is alert and oriented to person, place, and time.   Psychiatric:         Behavior: Behavior normal.         Thought Content: Thought content normal.         Judgment: Judgment normal.           ICD-10-CM ICD-9-CM   1. Neck pain  M54.2 723.1   2. Parkinson's disease without dyskinesia or fluctuating manifestations  G20.A1 332.0       Plan:     Patient with Parkinson's disease and is being followed by neurologist  Symptoms are under okay control.  Advised patient to keep appointment  Patient presented with complains of neck and back pain that is improving  Patient is offered muscle relaxer that she declined that she has some.  Offered patient pain medication including Tylenol with codeine..  Declined as patient is allergic to codeine  Advised patient to take NSAIDs, ibuprofen/leave as needed  Also use heating pad/blanket.  Discuss need for imaging with patient as well  As symptoms are improving at this time we do not need imaging studies  Will consider doing x-ray and physical " therapy    Medication List with Changes/Refills   Current Medications    ALBUTEROL (PROAIR HFA) 90 MCG/ACTUATION INHALER    Inhale 2 puffs into the lungs every 6 (six) hours as needed for Wheezing. Rescue    AMANTADINE HCL (SYMMETREL) 100 MG CAPSULE    Take 1 capsule by mouth once daily.    CARBIDOPA-LEVODOPA  MG (SINEMET)  MG PER TABLET    Take 1.5 tablets by mouth 5 (five) times daily.    ENZYMES,DIGESTIVE (DIGESTIVE ENZYMES ORAL)    Take 2 capsules by mouth once daily. OTC    LACTOBAC NO.41/BIFIDOBACT NO.7 (PROBIOTIC-10 ORAL)    Take 1 capsule by mouth once daily. OTC    MAGNESIUM 250 MG TAB    Take 1 tablet by mouth once daily. OTC

## 2024-12-19 ENCOUNTER — PATIENT MESSAGE (OUTPATIENT)
Dept: PRIMARY CARE CLINIC | Facility: CLINIC | Age: 44
End: 2024-12-19

## 2024-12-19 ENCOUNTER — OFFICE VISIT (OUTPATIENT)
Dept: PRIMARY CARE CLINIC | Facility: CLINIC | Age: 44
End: 2024-12-19
Payer: COMMERCIAL

## 2024-12-19 VITALS
BODY MASS INDEX: 24.66 KG/M2 | HEIGHT: 62 IN | WEIGHT: 134 LBS | SYSTOLIC BLOOD PRESSURE: 122 MMHG | DIASTOLIC BLOOD PRESSURE: 82 MMHG | RESPIRATION RATE: 15 BRPM | HEART RATE: 99 BPM | OXYGEN SATURATION: 99 %

## 2024-12-19 DIAGNOSIS — E61.1 IRON DEFICIENCY: ICD-10-CM

## 2024-12-19 DIAGNOSIS — L65.9 HAIR LOSS: Primary | ICD-10-CM

## 2024-12-19 DIAGNOSIS — R61 NIGHT SWEATS: ICD-10-CM

## 2024-12-19 DIAGNOSIS — G25.81 RESTLESS LEG: ICD-10-CM

## 2024-12-19 PROBLEM — K21.9 GERD (GASTROESOPHAGEAL REFLUX DISEASE): Status: ACTIVE | Noted: 2024-12-19

## 2024-12-19 LAB
%TRANSFERRIN SATURATION: 22.9 % (ref 20–50)
ABS NRBC COUNT: 0 X 10 3/UL (ref 0–0.01)
ABSOLUTE BASOPHIL: 0.05 X 10 3/UL (ref 0–0.22)
ABSOLUTE EOSINOPHIL: 0.04 X 10 3/UL (ref 0.04–0.54)
ABSOLUTE IMMATURE GRAN: 0 X 10 3/UL (ref 0–0.04)
ABSOLUTE LYMPHOCYTE: 1.97 X 10 3/UL (ref 0.86–4.75)
ABSOLUTE MONOCYTE: 0.45 X 10 3/UL (ref 0.22–1.08)
ALBUMIN SERPL-MCNC: 4.2 G/DL (ref 3.5–5.2)
ALBUMIN/GLOB SERPL ELPH: 1.4 {RATIO} (ref 1–2.7)
ALP ISOS SERPL LEV INH-CCNC: 79 U/L (ref 35–105)
ALT (SGPT): <5 U/L (ref 0–33)
ANION GAP SERPL CALC-SCNC: 10 MMOL/L (ref 8–17)
AST SERPL-CCNC: 20 U/L (ref 0–32)
B12: 571 PG/ML (ref 232–1245)
BASOPHILS NFR BLD: 1 % (ref 0.2–1.2)
BILIRUBIN, TOTAL: 0.34 MG/DL (ref 0–1.2)
BUN/CREAT SERPL: 16.7 (ref 6–20)
CALCIUM SERPL-MCNC: 8.5 MG/DL (ref 8.6–10.2)
CARBON DIOXIDE, CO2: 27 MMOL/L (ref 22–29)
CHLORIDE: 102 MMOL/L (ref 98–107)
CREAT SERPL-MCNC: 0.52 MG/DL (ref 0.5–0.9)
EOSINOPHIL NFR BLD: 0.8 % (ref 0.7–7)
ESTIMATED AVERAGE GLUCOSE: 110 MG/DL (ref 70–126)
FERRITIN: 22.4 NG/ML (ref 10–232)
FOLATE SERPL-MCNC: 15.1 NG/ML (ref 4.4–31)
FSH: 4.82 MIU/ML
GFR ESTIMATION: 115.97 ML/MIN/1.73M2
GLOBULIN: 3.1 G/DL (ref 1.5–4.5)
GLUCOSE: 89 MG/DL (ref 74–106)
HBA1C MFR BLD: 5.4 % (ref 4–6)
HCT VFR BLD AUTO: 38.5 % (ref 37–47)
HGB BLD-MCNC: 12.6 G/DL (ref 12–16)
IMMATURE GRANULOCYTES: 0 % (ref 0–0.5)
IRON BINDING CAPACITY: 297 UG/DL (ref 262–472)
IRON SERPL-MCNC: 68 UG/DL (ref 37–145)
LYMPHOCYTES NFR BLD: 39.6 % (ref 19.3–53.1)
MCH RBC QN AUTO: 29.9 PG (ref 27–32)
MCHC RBC AUTO-ENTMCNC: 32.7 G/DL (ref 32–36)
MCV RBC AUTO: 91.2 FL (ref 82–100)
MONOCYTES NFR BLD: 9 % (ref 4.7–12.5)
NEUTROPHILS # BLD AUTO: 2.47 X 10 3/UL (ref 2.15–7.56)
NEUTROPHILS NFR BLD: 49.6 % (ref 34–71.1)
NUCLEATED RED BLOOD CELLS: 0 /100 WBC (ref 0–0.2)
PLATELET # BLD AUTO: 361 X 10 3/UL (ref 135–400)
POTASSIUM: 4.3 MMOL/L (ref 3.5–5.1)
PROT SNV-MCNC: 7.3 G/DL (ref 6.4–8.3)
RBC # BLD AUTO: 4.22 X 10 6/UL (ref 4.2–5.4)
RDW-SD: 41.3 FL (ref 37–54)
SODIUM: 139 MMOL/L (ref 136–145)
T4, FREE: 1 NG/DL (ref 0.93–1.7)
TSH SERPL DL<=0.005 MIU/L-ACNC: 1.31 UIU/ML (ref 0.27–4.2)
UIBC SERPL-MCNC: 229 UG/DL (ref 112–306)
UREA NITROGEN (BUN): 8.7 MG/DL (ref 6–20)
VITAMIN D (25OHD): 30.4 NG/ML
WBC # BLD: 4.98 X 10 3/UL (ref 4.3–10.8)

## 2024-12-19 PROCEDURE — 3008F BODY MASS INDEX DOCD: CPT | Mod: CPTII,,, | Performed by: PHYSICIAN ASSISTANT

## 2024-12-19 PROCEDURE — 99214 OFFICE O/P EST MOD 30 MIN: CPT | Mod: S$PBB,,, | Performed by: PHYSICIAN ASSISTANT

## 2024-12-19 PROCEDURE — 1159F MED LIST DOCD IN RCRD: CPT | Mod: CPTII,,, | Performed by: PHYSICIAN ASSISTANT

## 2024-12-19 PROCEDURE — 3074F SYST BP LT 130 MM HG: CPT | Mod: CPTII,,, | Performed by: PHYSICIAN ASSISTANT

## 2024-12-19 PROCEDURE — 3079F DIAST BP 80-89 MM HG: CPT | Mod: CPTII,,, | Performed by: PHYSICIAN ASSISTANT

## 2024-12-19 RX ORDER — GABAPENTIN 100 MG/1
CAPSULE ORAL
Qty: 90 CAPSULE | Refills: 0 | Status: SHIPPED | OUTPATIENT
Start: 2024-12-19

## 2024-12-19 RX ORDER — PANTOPRAZOLE SODIUM 40 MG/1
1 TABLET, DELAYED RELEASE ORAL DAILY
COMMUNITY
Start: 2024-08-30

## 2024-12-19 NOTE — PROGRESS NOTES
Subjective:      Patient ID: Winter Pina is a 44 y.o. female.    Chief Complaint: Follow-up (Pt requesting IV FE infusion /Due for CRC /Dr Nathan stated pt needs to have gallbladder removed due to sludge )      HPI  Pt is here today for new complaints -    RLS- took OTC iron with vit C had to stop med due to constipation. Would like to consider iron infusion if she qualifies with insurance.  Fe in July 2024 - 15, no anemia present at that draw    Parkinsons - sees neurology, tried requip but Dc'd by neuro    Hair, skin, and nail changes- reports hair thinning and breaking easily,  dry skin, brittle nails     Emotional and cries easily, hot flashes       Review of Systems   Constitutional:  Negative for activity change, appetite change, chills, diaphoresis, fatigue and unexpected weight change.   Eyes:  Negative for visual disturbance.   Respiratory:  Negative for cough, chest tightness, shortness of breath and wheezing.    Cardiovascular:  Negative for chest pain, palpitations and leg swelling.   Gastrointestinal:  Negative for abdominal pain, constipation, nausea and vomiting.   Neurological:  Negative for vertigo, syncope, light-headedness and headaches.   Psychiatric/Behavioral:  Positive for agitation. Negative for behavioral problems, confusion, decreased concentration, dysphoric mood, hallucinations, self-injury, sleep disturbance and suicidal ideas. The patient is nervous/anxious. The patient is not hyperactive.        Medication List with Changes/Refills   New Medications    GABAPENTIN (NEURONTIN) 100 MG CAPSULE    1-2 tabs PO nightly 1 hour before sleep   Current Medications    AMANTADINE HCL (SYMMETREL) 100 MG CAPSULE    Take 1 capsule by mouth once daily.    ASCORBIC ACID, VITAMIN C, (VITAMIN C) 500 MG TABLET    Take 1 tablet (500 mg total) by mouth once daily.    CARBIDOPA-LEVODOPA  MG (SINEMET)  MG PER TABLET    Take 1.5 tablets by mouth 5 (five) times daily.    ERGOCALCIFEROL  "(ERGOCALCIFEROL) 50,000 UNIT CAP    Take 1 capsule (50,000 Units total) by mouth every 7 days.    FERROUS SULFATE 140 MG (45 MG IRON) TBSR    Take 1 tablet by mouth Daily.    LACTOBAC NO.41/BIFIDOBACT NO.7 (PROBIOTIC-10 ORAL)    Take 1 capsule by mouth once daily. OTC    MAGNESIUM 250 MG TAB    Take 1 tablet by mouth once daily. OTC    PANTOPRAZOLE (PROTONIX) 40 MG TABLET    Take 1 tablet by mouth once daily.    ROPINIROLE (REQUIP) 0.5 MG TABLET    Take 1 tablet (0.5 mg total) by mouth nightly as needed (restless leg).        Objective:     Vitals:    12/19/24 0839   BP: 122/82   Pulse: 99   Resp: 15   SpO2: 99%   Weight: 60.8 kg (134 lb)   Height: 5' 2" (1.575 m)        Physical Exam  Constitutional:       Appearance: Normal appearance. She is normal weight.   HENT:      Head: Normocephalic and atraumatic.      Nose: Nose normal.   Eyes:      Conjunctiva/sclera: Conjunctivae normal.      Comments: Eyes tracking normal on exam    Cardiovascular:      Rate and Rhythm: Normal rate and regular rhythm.      Pulses: Normal pulses.      Heart sounds: Normal heart sounds. No murmur heard.     No friction rub. No gallop.   Pulmonary:      Effort: Pulmonary effort is normal. No respiratory distress.      Breath sounds: Normal breath sounds. No stridor. No wheezing, rhonchi or rales.   Musculoskeletal:      Right lower leg: No edema.      Left lower leg: No edema.      Comments: Moves all extremities well and with good control  Normal gait observed      Skin:     General: Skin is warm and dry.      Capillary Refill: Capillary refill takes less than 2 seconds.   Neurological:      Mental Status: She is alert and oriented to person, place, and time.   Psychiatric:         Mood and Affect: Mood normal.         Behavior: Behavior normal.         Thought Content: Thought content normal.         Judgment: Judgment normal.            Assessment & Plan:     Hair loss  -     CBC Auto Differential; Future; Expected date: 12/19/2024  -  "    Comprehensive Metabolic Panel; Future; Expected date: 12/19/2024  -     T4, Free; Future; Expected date: 12/19/2024  -     TSH; Future; Expected date: 12/19/2024  -     Hemoglobin A1C; Future; Expected date: 12/19/2024  -     T4, Free; Future; Expected date: 12/19/2024  -     TSH; Future; Expected date: 12/19/2024  -     Calcitriol (1,25 DI-OH Vitamin D); Future; Expected date: 12/19/2024  -     Vitamin B12 & Folate; Future; Expected date: 12/19/2024  -     Iron, TIBC and Ferritin Panel; Future; Expected date: 12/20/2024    Night sweats  -     CBC Auto Differential; Future; Expected date: 12/19/2024  -     Comprehensive Metabolic Panel; Future; Expected date: 12/19/2024  -     T4, Free; Future; Expected date: 12/19/2024  -     TSH; Future; Expected date: 12/19/2024  -     Hemoglobin A1C; Future; Expected date: 12/19/2024  -     T4, Free; Future; Expected date: 12/19/2024  -     TSH; Future; Expected date: 12/19/2024  -     Calcitriol (1,25 DI-OH Vitamin D); Future; Expected date: 12/19/2024  -     Vitamin B12 & Folate; Future; Expected date: 12/19/2024  -     Iron, TIBC and Ferritin Panel; Future; Expected date: 12/20/2024  -     Follicle Stimulating Hormone; Future; Expected date: 12/19/2024    Iron deficiency  Comments:  start slow fe and vit C,  space to  Orders:  -     CBC Auto Differential; Future; Expected date: 12/19/2024  -     Comprehensive Metabolic Panel; Future; Expected date: 12/19/2024  -     T4, Free; Future; Expected date: 12/19/2024  -     TSH; Future; Expected date: 12/19/2024  -     Hemoglobin A1C; Future; Expected date: 12/19/2024  -     T4, Free; Future; Expected date: 12/19/2024  -     TSH; Future; Expected date: 12/19/2024  -     Calcitriol (1,25 DI-OH Vitamin D); Future; Expected date: 12/19/2024  -     Vitamin B12 & Folate; Future; Expected date: 12/19/2024  -     Iron, TIBC and Ferritin Panel; Future; Expected date: 12/20/2024    Restless leg  -     gabapentin (NEURONTIN) 100 MG capsule;  1-2 tabs PO nightly 1 hour before sleep  Dispense: 90 capsule; Refill: 0       Starting slow fe and vit C , miralax.  Space to every other day if better tolerated.     -Patient instructed that our office calls back on all labs and imaging within 1 week of receiving the results. Patient instructed to reach out to our office if they have not heard from us so that we can request the results from the lab/imaging center           Shruthi Albright PA-C

## 2024-12-21 ENCOUNTER — PATIENT MESSAGE (OUTPATIENT)
Dept: ADMINISTRATIVE | Facility: HOSPITAL | Age: 44
End: 2024-12-21
Payer: COMMERCIAL

## 2024-12-21 DIAGNOSIS — Z12.11 SCREENING FOR COLON CANCER: Primary | ICD-10-CM

## 2024-12-22 DIAGNOSIS — Z12.11 SCREENING FOR COLON CANCER: ICD-10-CM

## 2025-01-03 ENCOUNTER — PATIENT MESSAGE (OUTPATIENT)
Dept: PRIMARY CARE CLINIC | Facility: CLINIC | Age: 45
End: 2025-01-03
Payer: COMMERCIAL

## 2025-02-06 ENCOUNTER — OFFICE VISIT (OUTPATIENT)
Dept: OBSTETRICS AND GYNECOLOGY | Facility: CLINIC | Age: 45
End: 2025-02-06
Payer: COMMERCIAL

## 2025-02-06 ENCOUNTER — CLINICAL SUPPORT (OUTPATIENT)
Dept: OBSTETRICS AND GYNECOLOGY | Facility: CLINIC | Age: 45
End: 2025-02-06
Payer: COMMERCIAL

## 2025-02-06 VITALS
HEART RATE: 90 BPM | DIASTOLIC BLOOD PRESSURE: 67 MMHG | BODY MASS INDEX: 23.92 KG/M2 | WEIGHT: 130 LBS | SYSTOLIC BLOOD PRESSURE: 102 MMHG | HEIGHT: 62 IN

## 2025-02-06 DIAGNOSIS — Z01.419 GYNECOLOGIC EXAM NORMAL: Primary | ICD-10-CM

## 2025-02-06 DIAGNOSIS — Z01.89 ROUTINE LAB DRAW: Primary | ICD-10-CM

## 2025-02-06 DIAGNOSIS — N95.1 MENOPAUSE SYNDROME: ICD-10-CM

## 2025-02-06 PROBLEM — K82.9 DISORDER OF GALLBLADDER: Status: ACTIVE | Noted: 2025-02-06

## 2025-02-06 PROBLEM — R10.11 RIGHT UPPER QUADRANT PAIN: Status: ACTIVE | Noted: 2025-02-06

## 2025-02-06 PROBLEM — K29.40 ATROPHIC GASTRITIS: Status: ACTIVE | Noted: 2025-02-06

## 2025-02-06 PROBLEM — R10.811 RIGHT UPPER QUADRANT ABDOMINAL TENDERNESS: Status: ACTIVE | Noted: 2025-02-06

## 2025-02-06 LAB
ESTRADIOL: 282.9 PG/ML
FSH SERPL-ACNC: 3.9 MIU/ML

## 2025-02-06 PROCEDURE — 99396 PREV VISIT EST AGE 40-64: CPT | Mod: S$PBB,,, | Performed by: NURSE PRACTITIONER

## 2025-02-06 PROCEDURE — 3074F SYST BP LT 130 MM HG: CPT | Mod: CPTII,,, | Performed by: NURSE PRACTITIONER

## 2025-02-06 PROCEDURE — 1159F MED LIST DOCD IN RCRD: CPT | Mod: CPTII,,, | Performed by: NURSE PRACTITIONER

## 2025-02-06 PROCEDURE — 3078F DIAST BP <80 MM HG: CPT | Mod: CPTII,,, | Performed by: NURSE PRACTITIONER

## 2025-02-06 PROCEDURE — 3008F BODY MASS INDEX DOCD: CPT | Mod: CPTII,,, | Performed by: NURSE PRACTITIONER

## 2025-02-06 PROCEDURE — 1160F RVW MEDS BY RX/DR IN RCRD: CPT | Mod: CPTII,,, | Performed by: NURSE PRACTITIONER

## 2025-02-06 NOTE — PROGRESS NOTES
"Subjective     Patient ID: Winter Pina is a 44 y.o. female.    Chief Complaint:  Well Woman      History of Present Illness  HPI  Annual Exam-Premenopausal  Patient presents for annual exam. The patient has complaints today. The patient is sexually active. GYN screening history: last pap: was normal and last mammogram: was normal.  Patient complains of hot flashes and night sweats.  She does have a regular cycle that comes monthly.  She is currently being treated by her primary care for low iron however the iron supplement is giving her terrible constipation and abdominal pain.  She was instructed to go get an IV iron infusion at [facility].  She is leery because she feels that it will cause the same side effects.  She has a history of restless legs syndrome and she does report that when she is on her cycle the pain is worse    Outpatient Medications Marked as Taking for the 2/6/25 encounter (Office Visit) with Morenita Mckeon NP   Medication Sig Dispense Refill    amantadine HCL (SYMMETREL) 100 mg capsule Take 1 capsule by mouth once daily.      ascorbic acid, vitamin C, (VITAMIN C) 500 MG tablet Take 1 tablet (500 mg total) by mouth once daily. 90 tablet 3    carbidopa-levodopa  mg (SINEMET)  mg per tablet Take 1.5 tablets by mouth 5 (five) times daily.      ergocalciferol (ERGOCALCIFEROL) 50,000 unit Cap Take 1 capsule (50,000 Units total) by mouth every 7 days. 12 capsule 3    ferrous sulfate 140 mg (45 mg iron) TbSR Take 1 tablet by mouth Daily. 90 tablet 3    Lactobac no.41/Bifidobact no.7 (PROBIOTIC-10 ORAL) Take 1 capsule by mouth once daily. OTC      magnesium 250 mg Tab Take 1 tablet by mouth once daily. OTC       Vitals:    02/06/25 1340   BP: 102/67   Pulse: 90   Weight: 59 kg (130 lb)   Height: 5' 2" (1.575 m)     Past Medical History:   Diagnosis Date    Family history of breast cancer     myriad neg-LTR= 10%    Hypoglycemia     IBS (irritable bowel syndrome)  "    Lump of breast, left     Miscarriage     Parkinson disease     [place]    Poor nutrition      Past Surgical History:   Procedure Laterality Date    BREAST BIOPSY Left     BENIGN      SECTION      COLPOSCOPY      DILATION AND CURETTAGE OF UTERUS  2018    missed ab w/ Dr SHANE Denton    DILATION AND CURETTAGE OF UTERUS  2017    incomplete ab         GYN & OB History  No LMP recorded (within days).   Date of Last Pap: 2023    OB History    Para Term  AB Living   3 1 1 0 2 1   SAB IAB Ectopic Multiple Live Births   2 0 0   1      # Outcome Date GA Lbr Scottie/2nd Weight Sex Type Anes PTL Lv   3 SAB      SAB         Birth Comments: missed ab- d and c   2 SAB      SAB         Birth Comments: incomplete ab- d and c   1 Term      CS-Unspec   SHARITA      Birth Comments: ftp and lga       Review of Systems  Review of Systems   Constitutional:  Negative for activity change, appetite change, chills, fatigue and fever.   HENT:  Negative for nasal congestion and tinnitus.    Eyes:  Negative for visual disturbance.   Respiratory:  Negative for cough and shortness of breath.    Cardiovascular:  Negative for chest pain and palpitations.   Gastrointestinal:  Positive for constipation. Negative for abdominal pain, bloating, blood in stool, nausea and vomiting.   Endocrine: Negative for diabetes, hair loss and hot flashes.   Genitourinary:  Positive for hot flashes. Negative for bladder incontinence, decreased libido, dysmenorrhea, dyspareunia, dysuria, flank pain, frequency, genital sores, hematuria, menorrhagia, menstrual problem, pelvic pain, urgency, vaginal bleeding, vaginal discharge, vaginal pain, urinary incontinence, postcoital bleeding, postmenopausal bleeding, vaginal dryness and vaginal odor.   Musculoskeletal:  Positive for leg pain (RLS). Negative for arthralgias, back pain and myalgias.   Integumentary:  Negative for rash, acne, hair changes, mole/lesion, breast mass, nipple discharge,  breast skin changes and breast tenderness.   Neurological:  Negative for vertigo, syncope, numbness and headaches.   Hematological:  Does not bruise/bleed easily.   Psychiatric/Behavioral:  Negative for depression and sleep disturbance. The patient is not nervous/anxious.    Breast: Negative for asymmetry, lump, mass, mastodynia, nipple discharge, skin changes and tenderness         Objective   Physical Exam:   Constitutional: She is oriented to person, place, and time. Vital signs are normal. She appears well-developed and well-nourished.    HENT:   Head: Normocephalic.   Nose: No epistaxis.    Eyes: Lids are normal.    Neck: Trachea normal.    Cardiovascular:  Normal rate, regular rhythm and normal heart sounds.             Pulmonary/Chest: Effort normal and breath sounds normal. Right breast exhibits no mass, no skin change, no tenderness and no swelling. Left breast exhibits no mass, no skin change, no tenderness and no swelling. Breasts are symmetrical.            Abdominal: Soft.     Genitourinary:    Vagina, uterus and rectum normal.      Pelvic exam was performed with patient supine.   The external female genitalia was normal.     Labial bartholins normal.Cervix is normal. Right adnexum displays no mass and no tenderness. Left adnexum displays no mass and no tenderness. No erythema or vaginal discharge in the vagina.    Genitourinary Comments: Female chaperone present for exam               Musculoskeletal: Normal range of motion and moves all extremeties.      Lymphadenopathy:     She has no cervical adenopathy.    Neurological: She is alert and oriented to person, place, and time.    Skin: Skin is warm and dry.    Psychiatric: She has a normal mood and affect. Her speech is normal and behavior is normal. Judgment and thought content normal.            Assessment and Plan     1. Gynecologic exam normal    2. Menopause syndrome             Plan:  Gynecologic exam normal  Patient was counseled today on A.C.S.  Pap guidelines and recommendations for yearly pelvic exams, mammograms and monthly self breast exams; to see her PCP for other health maintenance.     Menopause syndrome  -     Follicle Stimulating Hormone; Future; Expected date: 02/06/2025  -     Estradiol; Future; Expected date: 02/06/2025  -     Testosterone, Free; Future; Expected date: 02/06/2025  We discussed the possibility of using an all natural supplements such as yam. cream or a bonafide supplement for the hot flashes.     Encouraged the patient to try and increase her iron supplement 2 daily since she is only taking it twice a week and fear of constipation and add in a MiraLax supplement daily as well as increasing her water intake.    Follow up in about 1 year (around 2/6/2026).

## 2025-02-07 ENCOUNTER — PATIENT MESSAGE (OUTPATIENT)
Dept: OBSTETRICS AND GYNECOLOGY | Facility: CLINIC | Age: 45
End: 2025-02-07
Payer: COMMERCIAL

## 2025-02-26 ENCOUNTER — PATIENT MESSAGE (OUTPATIENT)
Dept: FAMILY MEDICINE | Facility: CLINIC | Age: 45
End: 2025-02-26
Payer: COMMERCIAL

## 2025-02-27 ENCOUNTER — RESULTS FOLLOW-UP (OUTPATIENT)
Dept: OBSTETRICS AND GYNECOLOGY | Facility: CLINIC | Age: 45
End: 2025-02-27

## 2025-07-30 ENCOUNTER — TELEPHONE (OUTPATIENT)
Dept: INTERNAL MEDICINE | Facility: CLINIC | Age: 45
End: 2025-07-30
Payer: COMMERCIAL

## 2025-07-30 ENCOUNTER — PATIENT MESSAGE (OUTPATIENT)
Dept: INTERNAL MEDICINE | Facility: CLINIC | Age: 45
End: 2025-07-30

## 2025-07-30 NOTE — TELEPHONE ENCOUNTER
Copied from CRM #[id number]. Topic: General Inquiry - Patient Advice  >> Jul 30, 2025  8:05 AM Indy wrote:  Type:  Needs Medical Advice    Who Called: Winterjenny Pina  Would the patient rather a call back or a response via [facility]? Call  Best Call Back Number: Telephone Information:  Mobile          [phone]        Additional Information: Pt stated her virtual call was today for 7:20. She didn't understand what the lady was saying and the phone hung up. Pt requesting a call back today if possible.

## 2025-07-30 NOTE — TELEPHONE ENCOUNTER
Attempted to reach out to patient to let her know that Ms. Albright is no longer located at the previous clinic location. She would have to follow up with her primary care provider.